# Patient Record
Sex: FEMALE | HISPANIC OR LATINO
[De-identification: names, ages, dates, MRNs, and addresses within clinical notes are randomized per-mention and may not be internally consistent; named-entity substitution may affect disease eponyms.]

---

## 2020-08-03 ENCOUNTER — APPOINTMENT (OUTPATIENT)
Dept: ENDOCRINOLOGY | Facility: CLINIC | Age: 65
End: 2020-08-03
Payer: MEDICAID

## 2020-08-03 VITALS
BODY MASS INDEX: 36.12 KG/M2 | HEART RATE: 54 BPM | SYSTOLIC BLOOD PRESSURE: 140 MMHG | HEIGHT: 60 IN | DIASTOLIC BLOOD PRESSURE: 90 MMHG | WEIGHT: 184 LBS

## 2020-08-03 DIAGNOSIS — Z78.9 OTHER SPECIFIED HEALTH STATUS: ICD-10-CM

## 2020-08-03 PROCEDURE — 99205 OFFICE O/P NEW HI 60 MIN: CPT

## 2020-08-03 RX ORDER — SIMVASTATIN 20 MG/1
20 TABLET, FILM COATED ORAL
Refills: 0 | Status: ACTIVE | COMMUNITY

## 2020-08-03 RX ORDER — LISINOPRIL 5 MG/1
5 TABLET ORAL
Refills: 0 | Status: ACTIVE | COMMUNITY

## 2020-08-03 RX ORDER — ASPIRIN 81 MG
81 TABLET, DELAYED RELEASE (ENTERIC COATED) ORAL
Refills: 0 | Status: ACTIVE | COMMUNITY

## 2020-08-03 RX ORDER — AMLODIPINE BESYLATE 5 MG/1
5 TABLET ORAL
Refills: 0 | Status: ACTIVE | COMMUNITY
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
## 2020-08-03 NOTE — PHYSICAL EXAM
[Alert] : alert [Well Nourished] : well nourished [No Acute Distress] : no acute distress [Well Developed] : well developed [EOMI] : extra ocular movement intact [Normal Sclera/Conjunctiva] : normal sclera/conjunctiva [Thyroid Not Enlarged] : the thyroid was not enlarged [Normal Oropharynx] : the oropharynx was normal [No Proptosis] : no proptosis [No Thyroid Nodules] : no palpable thyroid nodules [No Respiratory Distress] : no respiratory distress [Clear to Auscultation] : lungs were clear to auscultation bilaterally [No Accessory Muscle Use] : no accessory muscle use [Normal Rate] : heart rate was normal [Normal S1, S2] : normal S1 and S2 [Regular Rhythm] : with a regular rhythm [No Edema] : no peripheral edema [Pedal Pulses Normal] : the pedal pulses are present [Not Distended] : not distended [Normal Bowel Sounds] : normal bowel sounds [Not Tender] : non-tender [Soft] : abdomen soft [Normal Anterior Cervical Nodes] : no anterior cervical lymphadenopathy [Normal Posterior Cervical Nodes] : no posterior cervical lymphadenopathy [No Spinal Tenderness] : no spinal tenderness [Normal Gait] : normal gait [No Stigmata of Cushings Syndrome] : no stigmata of Cushings Syndrome [Spine Straight] : spine straight [No Rash] : no rash [Normal Strength/Tone] : muscle strength and tone were normal [Normal Reflexes] : deep tendon reflexes were 2+ and symmetric [Acanthosis Nigricans] : no acanthosis nigricans [No Tremors] : no tremors [Oriented x3] : oriented to person, place, and time

## 2020-11-30 ENCOUNTER — APPOINTMENT (OUTPATIENT)
Dept: ENDOCRINOLOGY | Facility: CLINIC | Age: 65
End: 2020-11-30
Payer: MEDICAID

## 2020-11-30 VITALS
BODY MASS INDEX: 35.5 KG/M2 | WEIGHT: 188 LBS | SYSTOLIC BLOOD PRESSURE: 140 MMHG | DIASTOLIC BLOOD PRESSURE: 90 MMHG | HEART RATE: 70 BPM | HEIGHT: 61 IN

## 2020-11-30 DIAGNOSIS — Z00.00 ENCOUNTER FOR GENERAL ADULT MEDICAL EXAMINATION W/OUT ABNORMAL FINDINGS: ICD-10-CM

## 2020-11-30 PROCEDURE — 99214 OFFICE O/P EST MOD 30 MIN: CPT

## 2020-11-30 NOTE — ASSESSMENT
[FreeTextEntry1] : - Bone health:\par assess for presence of 1ry hyperparathyroidism and end organ damage. CMP from 2/2020 showed Cr of 1.1, CKD stage 3. Risks and benefits including ONJ, AF and GERD discussed at length. She verbalized understanding , reviewed vitamin d and dietary calcium for 2ry prevention of fractures, hold vit d replcament pending 24 hour urine free calcium. Labs not suggestive of secondary causes of OP. Referred to physical therapy for LE strengthening exercises and mobility assessment.\par \par Obesity, Morbid: Class I, complicated by severe DJD. High risk of metabolic syndrome and future complications. Discussed options including meds, bariatric surgery and lifestyle modification. RB and alternatives discussed. Questions answered and she verbalized understanding. Refer to nutrition and start hypocaloric, hypocarb diet in addition to exercise regimen. Refer to  now. Some weight loss (3 lbs). If no 5-7% weight loss observed on f/u, will consider medinitiation.\par

## 2021-02-22 ENCOUNTER — APPOINTMENT (OUTPATIENT)
Dept: ENDOCRINOLOGY | Facility: CLINIC | Age: 66
End: 2021-02-22
Payer: MEDICAID

## 2021-02-22 VITALS
BODY MASS INDEX: 36.44 KG/M2 | HEIGHT: 61 IN | DIASTOLIC BLOOD PRESSURE: 80 MMHG | WEIGHT: 193 LBS | SYSTOLIC BLOOD PRESSURE: 130 MMHG | HEART RATE: 75 BPM

## 2021-02-22 PROCEDURE — 99072 ADDL SUPL MATRL&STAF TM PHE: CPT

## 2021-02-22 PROCEDURE — 99214 OFFICE O/P EST MOD 30 MIN: CPT

## 2021-02-22 NOTE — ASSESSMENT
[FreeTextEntry1] : - Bone health:\par assess for presence of 1ry hyperparathyroidism and end organ damage. CMP from 2/2020 showed Cr of 1.1, CKD stage 3. Risks and benefits including ONJ, AF and GERD discussed at length. She verbalized understanding , reviewed vitamin d and dietary calcium for 2ry prevention of fractures, restart vit d replcament as 24 hour urine free calcium is <100. Labs not suggestive of secondary causes of OP. Referred to physical therapy for LE strengthening exercises and mobility assessment. we will obtain DEXA images for review given discordance between forearm. hip and L spine.\par \par Obesity, Morbid: Class I, complicated by severe DJD. High risk of metabolic syndrome and future complications. Discussed options including meds, bariatric surgery and lifestyle modification. RB and alternatives discussed. Questions answered and she verbalized understanding. Refer to nutrition and start hypocaloric, hypocarb diet in addition to exercise regimen. Refer to  now. Some weight loss (3 lbs). If no 5-7% weight loss observed on f/u, will consider medinitiation.\par
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
## 2021-12-20 ENCOUNTER — APPOINTMENT (OUTPATIENT)
Dept: ENDOCRINOLOGY | Facility: CLINIC | Age: 66
End: 2021-12-20
Payer: MEDICAID

## 2021-12-20 VITALS
SYSTOLIC BLOOD PRESSURE: 130 MMHG | HEART RATE: 76 BPM | DIASTOLIC BLOOD PRESSURE: 80 MMHG | HEIGHT: 61 IN | WEIGHT: 190 LBS | BODY MASS INDEX: 35.87 KG/M2

## 2021-12-20 PROCEDURE — 99215 OFFICE O/P EST HI 40 MIN: CPT
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
## 2021-12-20 NOTE — DATA REVIEWED
[FreeTextEntry1] : 11/2020 24 hour UF Ca low at 100\par \par 2/2021 CMP Cr 1.1, Ca 10.8, Vit D 23, Vit D 1-25 47\par \par DEXA OP per L spine, although report states L1-L3 were excluded as density was normal, only L4 wasa osteoporotic, Hip and forearme T score of -1.8

## 2021-12-20 NOTE — ASSESSMENT
[FreeTextEntry1] : - Bone health:\par assess for presence of 1ry hyperparathyroidism and end organ damage. CMP from 2/2020 showed Cr of 1.1, CKD stage 3. Risks and benefits including ONJ, AF and GERD discussed at length. She verbalized understanding , reviewed vitamin d and dietary calcium for 2ry prevention of fractures, restart vit d replcament as 24 hour urine free calcium is <100. Labs not suggestive of secondary causes of OP. Referred to physical therapy for LE strengthening exercises and mobility assessment. we will obtain DEXA images for review given discordance between forearm. hip and L spine.\par \par Obesity, Morbid: Class I, complicated by severe DJD. High risk of metabolic syndrome and future complications. Discussed options including meds, bariatric surgery and lifestyle modification. RB and alternatives discussed. Questions answered and she verbalized understanding. Refer to nutrition and reinenforced hypocaloric, hypocarb diet in addition to exercise regimen. Refer to  now. Some weight loss (4 lbs). as no 5-7% weight loss observed on f/u, will proceed w/ topiramate initiation. r/b/a and s/e reviewed.\par

## 2022-04-11 ENCOUNTER — APPOINTMENT (OUTPATIENT)
Dept: ENDOCRINOLOGY | Facility: CLINIC | Age: 67
End: 2022-04-11
Payer: MEDICAID

## 2022-04-11 VITALS
DIASTOLIC BLOOD PRESSURE: 80 MMHG | BODY MASS INDEX: 32.76 KG/M2 | HEART RATE: 72 BPM | WEIGHT: 178 LBS | SYSTOLIC BLOOD PRESSURE: 120 MMHG | HEIGHT: 62 IN

## 2022-04-11 DIAGNOSIS — J06.9 ACUTE UPPER RESPIRATORY INFECTION, UNSPECIFIED: ICD-10-CM

## 2022-04-11 PROCEDURE — 99215 OFFICE O/P EST HI 40 MIN: CPT

## 2022-04-11 RX ORDER — BENZONATATE 100 MG/1
100 CAPSULE ORAL 3 TIMES DAILY
Qty: 90 | Refills: 0 | Status: ACTIVE | COMMUNITY
Start: 2022-04-11 | End: 1900-01-01
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
## 2022-04-11 NOTE — ASSESSMENT
[FreeTextEntry1] : - Bone health:\par assess for presence of 1ry hyperparathyroidism and end organ damage. CMP from 2/2020 showed Cr of 1.1, CKD stage 3. Risks and benefits including ONJ, AF and GERD discussed at length. She verbalized understanding , reviewed vitamin d and dietary calcium for 2ry prevention of fractures, restart vit d replcament as 24 hour urine free calcium is <100. Labs not suggestive of secondary causes of OP. Referred to physical therapy for LE strengthening exercises and mobility assessment. we will obtain DEXA images for review given discordance between forearm. hip and L spine.\par \par Obesity, Morbid: Class I, complicated by severe DJD. High risk of metabolic syndrome and future complications. Discussed options including meds, bariatric surgery and lifestyle modification. RB and alternatives discussed. Questions answered and she verbalized understanding. Refer to nutrition and reinenforced hypocaloric, hypocarb diet in addition to exercise regimen. Refer to  now. Some weight loss (14 lbs). as no 5-7% weight loss observed on f/u, will proceed w/ topiramate initiation. r/b/a and s/e reviewed.\par \par URI\par no alarm s.s, requests symptomatic treatment of non productive cough, rx tessalon perles. Verbalized understanding and agrees with treatment plan, will contact MD and seek emergency medical care if condition changes.\par

## 2022-09-12 ENCOUNTER — APPOINTMENT (OUTPATIENT)
Dept: ENDOCRINOLOGY | Facility: CLINIC | Age: 67
End: 2022-09-12

## 2022-09-12 VITALS
WEIGHT: 172 LBS | DIASTOLIC BLOOD PRESSURE: 80 MMHG | SYSTOLIC BLOOD PRESSURE: 115 MMHG | HEART RATE: 62 BPM | HEIGHT: 62 IN | BODY MASS INDEX: 31.65 KG/M2

## 2022-09-12 PROCEDURE — 99215 OFFICE O/P EST HI 40 MIN: CPT

## 2022-09-12 NOTE — ASSESSMENT
[FreeTextEntry1] : - Bone health:\par assess for presence of 1ry hyperparathyroidism and end organ damage. CMP from 2/2020 showed Cr of 1.1, CKD stage 3. Risks and benefits including ONJ, AF and GERD discussed at length. She verbalized understanding , reviewed vitamin d and dietary calcium for 2ry prevention of fractures, restart vit d replcament as 24 hour urine free calcium is <100. Labs not suggestive of secondary causes of OP. Referred to physical therapy for LE strengthening exercises and mobility assessment. we will obtain DEXA images for review given discordance between forearm. hip and L spine.\par \par Obesity, Morbid: Class I, complicated by severe DJD. High risk of metabolic syndrome and future complications. Discussed options including meds, bariatric surgery and lifestyle modification. RB and alternatives discussed. Questions answered and she verbalized understanding. Refer to nutrition and reinenforced hypocaloric, hypocarb diet in addition to exercise regimen. Refer to  now. Some weight loss (24 lbs). as no 5-7% weight loss observed on f/u, will proceed w/ topiramate initiation. r/b/a and s/e reviewed.\par \par
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
## 2023-01-09 ENCOUNTER — APPOINTMENT (OUTPATIENT)
Dept: ENDOCRINOLOGY | Facility: CLINIC | Age: 68
End: 2023-01-09
Payer: MEDICAID

## 2023-01-09 VITALS
HEART RATE: 60 BPM | BODY MASS INDEX: 27.6 KG/M2 | HEIGHT: 62 IN | DIASTOLIC BLOOD PRESSURE: 80 MMHG | SYSTOLIC BLOOD PRESSURE: 115 MMHG | WEIGHT: 150 LBS

## 2023-01-09 DIAGNOSIS — E66.9 OBESITY, UNSPECIFIED: ICD-10-CM

## 2023-01-09 DIAGNOSIS — E83.52 HYPERCALCEMIA: ICD-10-CM

## 2023-01-09 DIAGNOSIS — Z13.820 ENCOUNTER FOR SCREENING FOR OSTEOPOROSIS: ICD-10-CM

## 2023-01-09 DIAGNOSIS — I10 ESSENTIAL (PRIMARY) HYPERTENSION: ICD-10-CM

## 2023-01-09 PROCEDURE — 99215 OFFICE O/P EST HI 40 MIN: CPT

## 2023-01-09 RX ORDER — UBIDECARENONE/VIT E ACET 100MG-5
25 MCG CAPSULE ORAL DAILY
Qty: 90 | Refills: 2 | Status: ACTIVE | COMMUNITY
Start: 2020-11-30 | End: 1900-01-01

## 2023-01-09 RX ORDER — TOPIRAMATE 25 MG/1
25 TABLET, FILM COATED ORAL
Qty: 90 | Refills: 3 | Status: ACTIVE | COMMUNITY
Start: 2021-12-20 | End: 1900-01-01

## 2023-01-09 NOTE — ASSESSMENT
[FreeTextEntry1] : - Bone health:\par assess for presence of 1ry hyperparathyroidism and end organ damage. CMP from 2/2020 showed Cr of 1.1, CKD stage 3. Risks and benefits including ONJ, AF and GERD discussed at length. She verbalized understanding , reviewed vitamin d and dietary calcium for 2ry prevention of fractures, restart vit d replcament as 24 hour urine free calcium is <100. Labs not suggestive of secondary causes of OP. Referred to physical therapy for LE strengthening exercises and mobility assessment. we will obtain DEXA images for review given discordance between forearm. hip and L spine (last on 2/2021, due at this time).\par \par Obesity, Morbid: Class I, complicated by severe DJD. High risk of metabolic syndrome and future complications. Discussed options including meds, bariatric surgery and lifestyle modification. RB and alternatives discussed. Questions answered and she verbalized understanding. Refer to nutrition and reinenforced hypocaloric, hypocarb diet in addition to exercise regimen. Refer to  now. Some weight loss (24 lbs). as no 5-7% weight loss observed on f/u, will proceed w/ topiramate initiation. r/b/a and s/e reviewed.\par \par
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
